# Patient Record
Sex: FEMALE | Race: WHITE | NOT HISPANIC OR LATINO | Employment: UNEMPLOYED | URBAN - METROPOLITAN AREA
[De-identification: names, ages, dates, MRNs, and addresses within clinical notes are randomized per-mention and may not be internally consistent; named-entity substitution may affect disease eponyms.]

---

## 2022-03-22 ENCOUNTER — MEDICAL CORRESPONDENCE (OUTPATIENT)
Dept: SURGERY | Facility: CLINIC | Age: 57
End: 2022-03-22

## 2022-03-23 ENCOUNTER — OFFICE VISIT (OUTPATIENT)
Dept: ENDOCRINOLOGY | Facility: CLINIC | Age: 57
End: 2022-03-23
Payer: COMMERCIAL

## 2022-03-23 ENCOUNTER — HOSPITAL ENCOUNTER (EMERGENCY)
Facility: CLINIC | Age: 57
Discharge: HOME | End: 2022-03-23
Attending: EMERGENCY MEDICINE | Admitting: EMERGENCY MEDICINE
Payer: COMMERCIAL

## 2022-03-23 VITALS
OXYGEN SATURATION: 98 % | DIASTOLIC BLOOD PRESSURE: 91 MMHG | BODY MASS INDEX: 17.14 KG/M2 | WEIGHT: 109.2 LBS | HEIGHT: 67 IN | SYSTOLIC BLOOD PRESSURE: 147 MMHG | HEART RATE: 88 BPM

## 2022-03-23 VITALS
DIASTOLIC BLOOD PRESSURE: 100 MMHG | HEART RATE: 78 BPM | TEMPERATURE: 98 F | RESPIRATION RATE: 16 BRPM | SYSTOLIC BLOOD PRESSURE: 137 MMHG | OXYGEN SATURATION: 98 %

## 2022-03-23 DIAGNOSIS — E43 SEVERE MALNUTRITION (H): Primary | ICD-10-CM

## 2022-03-23 DIAGNOSIS — L02.01 FACIAL ABSCESS: ICD-10-CM

## 2022-03-23 PROCEDURE — 99283 EMERGENCY DEPT VISIT LOW MDM: CPT | Mod: 25 | Performed by: EMERGENCY MEDICINE

## 2022-03-23 PROCEDURE — 99202 OFFICE O/P NEW SF 15 MIN: CPT | Mod: 25 | Performed by: SURGERY

## 2022-03-23 PROCEDURE — 10060 I&D ABSCESS SIMPLE/SINGLE: CPT | Performed by: EMERGENCY MEDICINE

## 2022-03-23 PROCEDURE — 250N000009 HC RX 250: Performed by: EMERGENCY MEDICINE

## 2022-03-23 PROCEDURE — 250N000013 HC RX MED GY IP 250 OP 250 PS 637: Performed by: EMERGENCY MEDICINE

## 2022-03-23 RX ORDER — LIDOCAINE HYDROCHLORIDE AND EPINEPHRINE 10; 10 MG/ML; UG/ML
INJECTION, SOLUTION INFILTRATION; PERINEURAL
Status: DISCONTINUED
Start: 2022-03-23 | End: 2022-03-23 | Stop reason: HOSPADM

## 2022-03-23 RX ORDER — SULFAMETHOXAZOLE AND TRIMETHOPRIM 200; 40 MG/5ML; MG/5ML
20 SUSPENSION ORAL 2 TIMES DAILY
Qty: 2800 ML | Refills: 0 | Status: SHIPPED | OUTPATIENT
Start: 2022-03-23

## 2022-03-23 RX ORDER — GABAPENTIN 250 MG/5ML
SOLUTION ORAL 3 TIMES DAILY
COMMUNITY

## 2022-03-23 RX ORDER — IBUPROFEN 600 MG/1
600 TABLET, FILM COATED ORAL ONCE
Status: COMPLETED | OUTPATIENT
Start: 2022-03-23 | End: 2022-03-23

## 2022-03-23 RX ADMIN — LIDOCAINE HYDROCHLORIDE 10 ML: 10 INJECTION, SOLUTION EPIDURAL; INFILTRATION; INTRACAUDAL; PERINEURAL at 21:54

## 2022-03-23 RX ADMIN — IBUPROFEN 600 MG: 600 TABLET ORAL at 20:36

## 2022-03-23 ASSESSMENT — ENCOUNTER SYMPTOMS
HEADACHES: 1
CHILLS: 1
FEVER: 0

## 2022-03-23 ASSESSMENT — PAIN SCALES - GENERAL: PAINLEVEL: NO PAIN (0)

## 2022-03-23 NOTE — ED TRIAGE NOTES
Pt is from [place] and is at [facility] for consultation for a gastric bypass reversal.  Pt has area on forehead between eye brows that has become increasingly more painful and raised. Pt does not know what happened, but wonders if she has been bit by something.

## 2022-03-23 NOTE — PROGRESS NOTES
56-year-old female with a complex past medical history.  In 2005 the patient underwent a Christina-en-Y gastric bypass in [facility].  She was approximately 320 pounds.  Approximately 3 years ago the patient started to develop signs and symptoms of dysphagia.  As best we can reconstruct from the documentation the patient had a distal esophageal stricture.  A laparoscopic G-tube was placed in [place].  The patient has been suffering from some significant malnourishment and has a body mass index of 17.  She uses the equivalent of 3 cans of tube feeding per day.  I do not have the exact preparation but I am estimating approximately 600 to 700 yocasta/day.  The patient is able to take some food intake toast for grilled cheese is something that she can tolerate although she reports having some allergies to dairy products.  A complicating factor is that the patient lives in [place].  This is approximately 600 miles away.  The patient does take occasional ibuprofen.  She does not report taking any narcotics and she states that she has quit smoking.  I did see a mention of a pain contract and some of the previous medical records but we will need to explore this further to better understand the nature of this.  In addition the patient has been tobacco free for approximately 3 weeks.  I inspected her G-tube site it is clean no evidence of infection.  The patient reports having a CAT scan likely within the last 6 months.  I understand the patient has [place] Medicaid which will need to be addressed should we decide to perform any procedures.    Past medical history anxiety, ascending aortic aneurysm, coronary artery mild stenosis, bipolar affective disorder, possible congestive heart failure, essential hypertension and history of substance abuse and TIA    Past surgical history: Cervical fusion (anterior 2 levels), history of chest tube insertion, spinal fusion 1999 upper endoscopy 2018, history of wrist  "fracture      Current Outpatient Medications:      gabapentin (NEURONTIN) 250 MG/5ML solution, Take by mouth 3 times daily, Disp: , Rfl:     BP (!) 147/91 (BP Location: Right arm, Patient Position: Chair, Cuff Size: Adult Small)   Pulse 88   Ht 1.702 m (5' 7\")   Wt 49.5 kg (109 lb 3.2 oz)   SpO2 98%   BMI 17.10 kg/m      G-tube site inspected.  No evidence of infection    Complex patient who likely will benefit from reversal of her gastric bypass.  Prior to that of course we need to define the extent of her esophageal disease or ulceration if there is in fact any.  We will need to ensure that the patient may have procedures performed in [place] given her insurance plan.  An upper endoscopy under general anesthetic would be the next best first step.  However I think that we need to that the patient's medical status to review her medical history including last CT scan have her increase her nutrition and come back in 1 month to see Dr. JYOTSNA Blanchard for further evaluation and possible endoscopy.      "

## 2022-03-23 NOTE — NURSING NOTE
"Chief Complaint   Patient presents with     RECHECK     Follow-up S/P Bariatric Surgery with some abdominal pain LRNY with [facility]       Vitals:    03/23/22 0818   BP: (!) 147/91   BP Location: Right arm   Patient Position: Chair   Cuff Size: Adult Small   Pulse: 88   SpO2: 98%   Weight: 49.5 kg (109 lb 3.2 oz)   Height: 1.702 m (5' 7\")       Body mass index is 17.1 kg/m .                          "

## 2022-03-23 NOTE — LETTER
Date:March 24, 2022      Provider requested that no letter be sent. Do not send.       [facility]

## 2022-03-23 NOTE — Clinical Note
Patient should see Dr. Blanchard.  We will need to get all of her previous operative notes if they are not in the computer and the imaging from her last CT scan.  I have advised the patient to increase her tube feed intake.  We will need to ensure that her insurance allows her to have procedures performed at [place].  The long-term goal will be to have a reversal of her gastric bypass done endoscopically however if she has an esophageal stricture she may need a stent or other intervention prior to that.  Once the details have been worked out regarding her insurance status I like to have her see Dr. Blanchard likely in 1 month hopefully her nutrition will be better at that point

## 2022-03-23 NOTE — LETTER
3/23/2022       RE: Josefina Blake  [address]  [place] [address]     Dear Colleague,    Thank you for referring your patient, Josefina Blaek, to the [facility] [place] [facility]. Please see a copy of my visit note below.    56-year-old female with a complex past medical history.  In 2005 the patient underwent a Christina-en-Y gastric bypass in [facility].  She was approximately 320 pounds.  Approximately 3 years ago the patient started to develop signs and symptoms of dysphagia.  As best we can reconstruct from the documentation the patient had a distal esophageal stricture.  A laparoscopic G-tube was placed in [place].  The patient has been suffering from some significant malnourishment and has a body mass index of 17.  She uses the equivalent of 3 cans of tube feeding per day.  I do not have the exact preparation but I am estimating approximately 600 to 700 yocasta/day.  The patient is able to take some food intake toast for grilled cheese is something that she can tolerate although she reports having some allergies to dairy products.  A complicating factor is that the patient lives in [place].  This is approximately 600 miles away.  The patient does take occasional ibuprofen.  She does not report taking any narcotics and she states that she has quit smoking.  I did see a mention of a pain contract and some of the previous medical records but we will need to explore this further to better understand the nature of this.  In addition the patient has been tobacco free for approximately 3 weeks.  I inspected her G-tube site it is clean no evidence of infection.  The patient reports having a CAT scan likely within the last 6 months.  I understand the patient has [place] Medicaid which will need to be addressed should we decide to perform any procedures.    Past medical history anxiety, ascending aortic aneurysm, coronary  "artery mild stenosis, bipolar affective disorder, possible congestive heart failure, essential hypertension and history of substance abuse and TIA    Past surgical history: Cervical fusion (anterior 2 levels), history of chest tube insertion, spinal fusion 1999 upper endoscopy 2018, history of wrist fracture      Current Outpatient Medications:      gabapentin (NEURONTIN) 250 MG/5ML solution, Take by mouth 3 times daily, Disp: , Rfl:     BP (!) 147/91 (BP Location: Right arm, Patient Position: Chair, Cuff Size: Adult Small)   Pulse 88   Ht 1.702 m (5' 7\")   Wt 49.5 kg (109 lb 3.2 oz)   SpO2 98%   BMI 17.10 kg/m      G-tube site inspected.  No evidence of infection    Complex patient who likely will benefit from reversal of her gastric bypass.  Prior to that of course we need to define the extent of her esophageal disease or ulceration if there is in fact any.  We will need to ensure that the patient may have procedures performed in [place] given her insurance plan.  An upper endoscopy under general anesthetic would be the next best first step.  However I think that we need to that the patient's medical status to review her medical history including last CT scan have her increase her nutrition and come back in 1 month to see Dr. JYOTSNA Blanchard for further evaluation and possible endoscopy.          Again, thank you for allowing me to participate in the care of your patient.      Sincerely,    Abhinavpierce Ferguson MD      "

## 2022-03-24 ENCOUNTER — PATIENT OUTREACH (OUTPATIENT)
Dept: CARE COORDINATION | Facility: CLINIC | Age: 57
End: 2022-03-24
Payer: COMMERCIAL

## 2022-03-24 DIAGNOSIS — Z71.89 OTHER SPECIFIED COUNSELING: ICD-10-CM

## 2022-03-24 NOTE — DISCHARGE INSTRUCTIONS
Please take the antibiotics as prescribed.     Remove the wick from your face tomorrow evening.     Keep the area clean and dry.     Please make an appointment to follow up with Your Primary Care Provider in 3-4 days even if entirely better.

## 2022-03-24 NOTE — PROGRESS NOTES
Clinic Care Coordination Contact  [facility]: Post-Discharge Note  SITUATION                                                      Admission:    Admission Date: 03/23/22   Reason for Admission: Facial abscess  Discharge:   Discharge Date: 03/23/22  Discharge Diagnosis: Facial abscess    BACKGROUND                                                      Per hospital discharge summary and inpatient provider notes:    Josefina Blake is a 56 year old female with PMH significant for Christina-en-Y gastric bypass (2005) c/b gastric anastomotic stricture now using G-tube who presents to the ED for evaluation of an abscess on the bridge of her nose.  Patient reports that she thought she had a zit 5 days ago and initially popped it.  She states that since then it has become increasingly inflamed, painful, and enlarged.  She denies fever.  She does report some chills, but reports they are close to baseline for her.  She also endorses headache and a small amount of blood in her ear.  She denies any allergies to antibiotics.  Patient reports she is from [place] and is here in [place] to reverse her gastric bypass surgery.    ASSESSMENT      Enrollment  Primary Care Care Coordination Status: Not a Candidate    Discharge Assessment  How are you doing now that you are home?: I am doing good  How are your symptoms? (Red Flag symptoms escalate to triage hotline per guidelines): Unchanged  Do you feel your condition is stable enough to be safe at home until your provider visit?: Yes  Does the patient have their discharge instructions? : Yes  Does the patient have questions regarding their discharge instructions? : No  Were you started on any new medications or were there changes to any of your previous medications? : Yes  Does the patient have all of their medications?: No (see comment) ([place] and does not know if she can get her prescription with her insurance. Patient is going to try and go to a pharmacy  today otherwise wait until she goes back home on Saturday.)  Do you have questions regarding any of your medications? : No  Do you have all of your needed medical supplies or equipment (DME)?  (i.e. oxygen tank, CPAP, cane, etc.): Yes  Discharge follow-up appointment scheduled within 14 calendar days? : No  Is patient agreeable to assistance with scheduling? : No                  PLAN                                                      Outpatient Plan: Please make an appointment to follow up with Your Primary Care Provider in 3-4  days even if entirely better.    No future appointments.      For any urgent concerns, please contact our 24 hour nurse triage line: [phone] ([phone])         LAYLA Lawson  [phone]  Connected Care Resource [facility]

## 2022-03-24 NOTE — ED PROVIDER NOTES
[place] EMERGENCY DEPARTMENT ([facility])  3/23/22    History     Chief Complaint   Patient presents with     Facial Pain     HPI  Josefina Blake is a 56 year old female with PMH significant for Christina-en-Y gastric bypass (2005) c/b gastric anastomotic stricture now using G-tube who presents to the ED for evaluation of an abscess on the bridge of her nose.  Patient reports that she thought she had a zit 5 days ago and initially popped it.  She states that since then it has become increasingly inflamed, painful, and enlarged.  She denies fever.  She does report some chills, but reports they are close to baseline for her.  She also endorses headache and a small amount of blood in her ear.  She denies any allergies to antibiotics.  Patient reports she is from [place] and is here in [place] to reverse her gastric bypass surgery.    Past Medical History  No past medical history on file.  No past surgical history on file.  gabapentin (NEURONTIN) 250 MG/5ML solution      No Known Allergies  Past medical history, past surgical history, medications, and allergies were reviewed with the patient. Additional pertinent items: None    Family History  No family history on file.  Family history was reviewed with the patient. Additional pertinent items: None    Social History  Social History     Tobacco Use     Smoking status: Not on file     Smokeless tobacco: Not on file   Substance Use Topics     Alcohol use: Not on file     Drug use: Not on file      Social history was reviewed with the patient. Additional pertinent items: None      Review of Systems   Constitutional: Positive for chills. Negative for fever.   HENT:        Blood in ear   Skin:        Abscess on bridge of nose    Neurological: Positive for headaches.   All other systems reviewed and are negative.    A complete review of systems was performed with pertinent positives and negatives noted in the HPI, and all other systems negative.    Physical Exam       Physical Exam  Constitutional:       Appearance: She is well-developed.   HENT:      Head: Normocephalic and atraumatic.     Cardiovascular:      Rate and Rhythm: Normal rate and regular rhythm.      Heart sounds: Normal heart sounds.   Pulmonary:      Effort: Pulmonary effort is normal. No respiratory distress.      Breath sounds: Normal breath sounds.   Abdominal:      General: There is no distension.      Palpations: Abdomen is soft.      Tenderness: There is no abdominal tenderness. There is no rebound.      Comments: G-tube in place.   Musculoskeletal:         General: No tenderness.      Cervical back: Normal range of motion.   Skin:     General: Skin is warm and dry.   Neurological:      Mental Status: She is alert and oriented to person, place, and time.   Psychiatric:         Behavior: Behavior normal.         Thought Content: Thought content normal.         ED Course      Procedures   8:18 PM  The patient was seen and examined by Viky Hernandez MD in Room EDVTD.        The medical record was reviewed and interpreted.         Procedure: Incision and Drainage   LOCATIONS:  Between eye brows     ANESTHESIA:  Local field block using Lidocaine 1% with epinephrine, total of 3 mLs     PREPARATION:  Cleansed with chlroprep     PROCEDURE:  Area was incised with # 11 Blade ([place]) with a Single Straight incision.  Wound treatment included Deloculation and Purulent Drainage.  Packing consisted of Plain Gauze.  Appropriate dressing was applied to cover the area.    Patient Status:        Patient tolerated the procedure well. There were no complications.                 No results found for any visits on 03/23/22.  Medications   lidocaine 1 % 10 mL (has no administration in time range)   lidocaine 1% with EPINEPHrine 1:100,000 1 %-1:947297 injection (has no administration in time range)   ibuprofen (ADVIL/MOTRIN) tablet 600 mg (600 mg Oral Given 3/23/22 2036)        Assessments & Plan (with Medical  Decision Making)   Patient is a very nice 56-year-old female presents to the ER due to an abscess between both eyebrows.  Patient states that it was a small pimple that she initially was picking and now has gotten bigger.  This was a small abscess with a fluctuant tip.  Patient had a small I&D performed with about a 4 mm small cut.  Was able to aspirate pus.  I placed a small amount of packing.  Patient tolerated procedure well.  Plan to discharge her home with some Bactrim for her to take at home.  Patient to follow-up for further care.  Patient stable for discharge.  No indication for needing hospitalization.  No indication for needing IV antibiotics.  No signs of other facial cellulitis.    I have reviewed the nursing notes. I have reviewed the findings, diagnosis, plan and need for follow up with the patient.    -----------------------------------------------------------------  This part of the medical record was transcribed by Aidee Calix, Medical Scribe, from a dictation done by Viky Hernandez MD.     New Prescriptions    No medications on file       Final diagnoses:   Facial abscess     Amos BARBOZA, am serving as a trained medical scribe to document services personally performed by Viky Hernandez MD, based on the provider's statements to me.     IViky MD, was physically present and have reviewed and verified the accuracy of this note documented by Amos Calderon.    --  Viky Hernandez MD  [facility] EMERGENCY DEPARTMENT  3/23/2022     Viky Hernandez MD  03/24/22 0123

## 2022-03-29 ENCOUNTER — PATIENT OUTREACH (OUTPATIENT)
Dept: GASTROENTEROLOGY | Facility: CLINIC | Age: 57
End: 2022-03-29
Payer: COMMERCIAL

## 2022-03-29 ENCOUNTER — PREP FOR PROCEDURE (OUTPATIENT)
Dept: GASTROENTEROLOGY | Facility: CLINIC | Age: 57
End: 2022-03-29
Payer: COMMERCIAL

## 2022-03-29 DIAGNOSIS — E46 MALNUTRITION (H): Primary | ICD-10-CM

## 2022-03-29 DIAGNOSIS — Z98.84 HISTORY OF GASTRIC BYPASS: ICD-10-CM

## 2022-03-29 DIAGNOSIS — K22.2 ESOPHAGEAL STRICTURE: ICD-10-CM

## 2022-03-29 DIAGNOSIS — Z11.59 ENCOUNTER FOR SCREENING FOR OTHER VIRAL DISEASES: Primary | ICD-10-CM

## 2022-03-29 NOTE — TELEPHONE ENCOUNTER
Referred by Dr Ferguson to Dr. Blanchard    Per Dr Ferguson  Complex patient who likely will benefit from reversal of her gastric bypass.  Prior to that of course we need to define the extent of her esophageal disease or ulceration if there is in fact any.  We will need to ensure that the patient may have procedures performed in [place] given her insurance plan.  An upper endoscopy under general anesthetic would be the next best first step.  However I think that we need to that the patient's medical status to review her medical history including last CT scan have her increase her nutrition and come back in 1 month to see Dr. JYOTSNA Blanchard for further evaluation and possible endoscopy.     Per Dr. Blanchard  Would be best if we start with a clinic visit, but we can have the procedures scheduled for soon thereafter   If the esophageal stricture allows, I may be able to stent and reverse in the same procedure     Called patient to discuss. Clinic scheduled  in person, OR scheduled . Will organize preop physical and covid test while in town.     Preop scheduled  at 1pm      Please assist in scheduling:     Procedure/Imaging/Clinic: EGD/EUS  Physician: Dr. Blanchard  Timin/17  Procedure length:provider average  Anesthesia:gen  Dx: hx gastric bypass, esoph stricture, malnutrition  Tier:2  Location: [facility]

## 2022-03-30 NOTE — TELEPHONE ENCOUNTER
FUTURE VISIT INFORMATION      SURGERY INFORMATION:    preop for procedure  with [facility]    RECORDS REQUESTED FROM:       Primary Care Provider: Baudilio Scott MD  - [facility]    Most recent EKG+ Tracin21- [facility]    Most recent ECHO: 21- [facility]

## 2022-04-03 ENCOUNTER — HEALTH MAINTENANCE LETTER (OUTPATIENT)
Age: 57
End: 2022-04-03

## 2022-04-04 ENCOUNTER — HOSPITAL ENCOUNTER (OUTPATIENT)
Facility: CLINIC | Age: 57
End: 2022-04-04
Attending: INTERNAL MEDICINE | Admitting: INTERNAL MEDICINE
Payer: COMMERCIAL

## 2022-04-08 DIAGNOSIS — Z11.59 ENCOUNTER FOR SCREENING FOR OTHER VIRAL DISEASES: Primary | ICD-10-CM

## 2022-04-27 ENCOUNTER — PATIENT OUTREACH (OUTPATIENT)
Dept: GASTROENTEROLOGY | Facility: CLINIC | Age: 57
End: 2022-04-27
Payer: COMMERCIAL

## 2022-04-27 NOTE — TELEPHONE ENCOUNTER
Called pt to discuss her requesting timing of procedures. Pt coming from [place],     PAC, covid on a Thursday and then procedure on a Friday where he is attending?     Per patient, she's having trouble finding someone to stay with her for procedure and she does not have insurance clearnce for care here yet. Explained complexity of scheduling. Will move clinic out to 7/14 and see if we can do procedure on 7/15-     ML

## 2022-05-12 ENCOUNTER — PRE VISIT (OUTPATIENT)
Dept: SURGERY | Facility: CLINIC | Age: 57
End: 2022-05-12

## 2022-06-02 ENCOUNTER — PATIENT OUTREACH (OUTPATIENT)
Dept: GASTROENTEROLOGY | Facility: CLINIC | Age: 57
End: 2022-06-02
Payer: COMMERCIAL

## 2022-06-16 ENCOUNTER — PATIENT OUTREACH (OUTPATIENT)
Dept: GASTROENTEROLOGY | Facility: CLINIC | Age: 57
End: 2022-06-16
Payer: COMMERCIAL

## 2022-06-16 NOTE — TELEPHONE ENCOUNTER
Called pt to discuss clinic/procedure previously discussed for July. In person clinic now closed for July as Dr. Blanchard isn't available in person in the month of July.   Mail box full, cannot leave message.     Mychart sent.    ML

## 2022-07-09 ENCOUNTER — TRANSFERRED RECORDS (OUTPATIENT)
Dept: HEALTH INFORMATION MANAGEMENT | Facility: CLINIC | Age: 57
End: 2022-07-09

## 2022-07-14 ENCOUNTER — PATIENT OUTREACH (OUTPATIENT)
Dept: GASTROENTEROLOGY | Facility: CLINIC | Age: 57
End: 2022-07-14

## 2022-07-14 LAB
ALT SERPL-CCNC: 14 U/L (ref 6–50)
AST SERPL-CCNC: 15 U/L (ref 5–40)

## 2022-07-14 NOTE — PROGRESS NOTES
Returned call to Catherine at [facility] in [place]- pt admitted there with PNA, they are wanting to help reschedule in person visit with Dr. Blanchard    Declined scheduling new pt visit at this time with staff as pt still needs to get insurance clearance to have visit. Pt can contact us directly, left message    ML

## 2022-07-18 LAB
CREATININE (EXTERNAL): 0.36 MG/DL (ref 0.54–0.99)
GFR ESTIMATED (EXTERNAL): >60 ML/MIN/1.73M2
GLUCOSE (EXTERNAL): 109 MG/DL (ref 65–99)
POTASSIUM (EXTERNAL): 4.6 MMOL/L (ref 3.5–5)

## 2022-07-18 NOTE — PROGRESS NOTES
Local hospital called back, will place new referral for care as hospital follow up, fax # provided.    ML

## 2022-07-19 ENCOUNTER — TRANSFERRED RECORDS (OUTPATIENT)
Dept: HEALTH INFORMATION MANAGEMENT | Facility: CLINIC | Age: 57
End: 2022-07-19

## 2022-07-22 ENCOUNTER — TRANSCRIBE ORDERS (OUTPATIENT)
Dept: OTHER | Age: 57
End: 2022-07-22

## 2022-07-22 DIAGNOSIS — E43 SEVERE PROTEIN-CALORIE MALNUTRITION (H): ICD-10-CM

## 2022-07-22 DIAGNOSIS — K31.1 GASTRIC OUTLET OBSTRUCTION: ICD-10-CM

## 2022-10-03 ENCOUNTER — HEALTH MAINTENANCE LETTER (OUTPATIENT)
Age: 57
End: 2022-10-03

## 2022-11-29 ENCOUNTER — DOCUMENTATION ONLY (OUTPATIENT)
Dept: GASTROENTEROLOGY | Facility: CLINIC | Age: 57
End: 2022-11-29

## 2022-11-29 NOTE — PROGRESS NOTES
Called PT and left VM.    Called to remind patient of their upcoming appointment with our GI clinic, on 12/08/22 at 9:30 AM with Dr. Blanchard . This appointment is scheduled as an in-person appt. Please arrive 15 minutes early to check in for your appointment. , if your appointment is virtual (video or telephone) you need to be in [place] for the visit. To reschedule or cancel patient to call [phone].    SK

## 2022-11-30 NOTE — PROGRESS NOTES
PT called and cancelled in-person visit with Dr. Blanchard. Noted financial strain as reason for cancellation.     PT stated that she would be able to reschedule for sometime in August 2023. PT was told to call back next year when Dr. Blanchard's schedule was ready, and when she would have a better idea of travel accommodations.    SK

## 2023-05-21 ENCOUNTER — HEALTH MAINTENANCE LETTER (OUTPATIENT)
Age: 58
End: 2023-05-21

## 2024-05-19 ENCOUNTER — HEALTH MAINTENANCE LETTER (OUTPATIENT)
Age: 59
End: 2024-05-19

## 2024-07-28 ENCOUNTER — HEALTH MAINTENANCE LETTER (OUTPATIENT)
Age: 59
End: 2024-07-28